# Patient Record
Sex: FEMALE | Race: ASIAN | NOT HISPANIC OR LATINO
[De-identification: names, ages, dates, MRNs, and addresses within clinical notes are randomized per-mention and may not be internally consistent; named-entity substitution may affect disease eponyms.]

---

## 2020-09-30 ENCOUNTER — APPOINTMENT (OUTPATIENT)
Dept: UROLOGY | Facility: CLINIC | Age: 65
End: 2020-09-30
Payer: MEDICARE

## 2020-09-30 VITALS
RESPIRATION RATE: 18 BRPM | TEMPERATURE: 98.1 F | HEART RATE: 66 BPM | OXYGEN SATURATION: 97 % | WEIGHT: 160 LBS | SYSTOLIC BLOOD PRESSURE: 150 MMHG | HEIGHT: 63 IN | DIASTOLIC BLOOD PRESSURE: 90 MMHG | BODY MASS INDEX: 28.35 KG/M2

## 2020-09-30 DIAGNOSIS — Z84.1 FAMILY HISTORY OF DISORDERS OF KIDNEY AND URETER: ICD-10-CM

## 2020-09-30 DIAGNOSIS — Z86.39 PERSONAL HISTORY OF OTHER ENDOCRINE, NUTRITIONAL AND METABOLIC DISEASE: ICD-10-CM

## 2020-09-30 PROCEDURE — 99203 OFFICE O/P NEW LOW 30 MIN: CPT

## 2020-09-30 RX ORDER — ATORVASTATIN CALCIUM 20 MG/1
20 TABLET, FILM COATED ORAL
Refills: 0 | Status: ACTIVE | COMMUNITY

## 2020-09-30 NOTE — ASSESSMENT
[FreeTextEntry1] : 66 yo F with abnormal UA, LUTS\par \par - Obtain UA results from PCP\par - UA, culture\par - PVR = 0ml\par - Discussed possible etiologies for LUTS. Discussed ways to manage them including behavioral modifications such as adequate hydration, controlling constipation, restricting fluids in the evening\par

## 2020-09-30 NOTE — HISTORY OF PRESENT ILLNESS
[FreeTextEntry1] : 64 yo Kiswahili speaking F with abnormal UA,\par no dysuria, no gross hematuria\par no UTI for many years\par 1 yr of LUTS - some weak flow, some straining, incomplete bladder emptying\par Void every 1-2 hrs, nocturia 3/night\par Drinks 3 cups of water, 3 cups of coffee daily\par Some mixed incontinence - sometimes pantiliner when leaving the house\par chronic constipation\par LMP = 15 yrs ago, 2 children, , Not sexually active\par

## 2020-09-30 NOTE — PHYSICAL EXAM
[General Appearance - Well Developed] : well developed [Well Groomed] : well groomed [General Appearance - Well Nourished] : well nourished [Normal Appearance] : normal appearance [General Appearance - In No Acute Distress] : no acute distress [Edema] : no peripheral edema [Respiration, Rhythm And Depth] : normal respiratory rhythm and effort [Exaggerated Use Of Accessory Muscles For Inspiration] : no accessory muscle use [Abdomen Soft] : soft [Costovertebral Angle Tenderness] : no ~M costovertebral angle tenderness [Abdomen Tenderness] : non-tender [External Female Genitalia] : normal external genitalia [Urinary Bladder Findings] : the bladder was normal on palpation [Urethral Meatus] : normal urethra [Normal Station and Gait] : the gait and station were normal for the patient's age [] : no rash [No Focal Deficits] : no focal deficits [Oriented To Time, Place, And Person] : oriented to person, place, and time [Not Anxious] : not anxious [Affect] : the affect was normal [Mood] : the mood was normal [No Palpable Adenopathy] : no palpable adenopathy [FreeTextEntry1] : mild rectocele

## 2020-10-04 LAB
APPEARANCE: CLEAR
BACTERIA UR CULT: NORMAL
BACTERIA: NEGATIVE
BILIRUBIN URINE: NEGATIVE
BLOOD URINE: NORMAL
COLOR: NORMAL
GLUCOSE QUALITATIVE U: NEGATIVE
HYALINE CASTS: 1 /LPF
KETONES URINE: NEGATIVE
LEUKOCYTE ESTERASE URINE: NEGATIVE
MICROSCOPIC-UA: NORMAL
NITRITE URINE: NEGATIVE
PH URINE: 6
PROTEIN URINE: NEGATIVE
RED BLOOD CELLS URINE: 1 /HPF
SPECIFIC GRAVITY URINE: 1.02
SQUAMOUS EPITHELIAL CELLS: 1 /HPF
UROBILINOGEN URINE: NORMAL
WHITE BLOOD CELLS URINE: 1 /HPF

## 2020-12-16 ENCOUNTER — APPOINTMENT (OUTPATIENT)
Age: 65
End: 2020-12-16

## 2022-10-26 ENCOUNTER — APPOINTMENT (OUTPATIENT)
Age: 67
End: 2022-10-26

## 2022-10-26 VITALS
SYSTOLIC BLOOD PRESSURE: 154 MMHG | RESPIRATION RATE: 17 BRPM | WEIGHT: 160 LBS | HEIGHT: 63 IN | HEART RATE: 55 BPM | OXYGEN SATURATION: 97 % | TEMPERATURE: 96.3 F | BODY MASS INDEX: 28.35 KG/M2 | DIASTOLIC BLOOD PRESSURE: 91 MMHG

## 2022-10-26 PROCEDURE — 99214 OFFICE O/P EST MOD 30 MIN: CPT

## 2022-10-26 RX ORDER — ESTRADIOL 0.1 MG/G
0.1 CREAM VAGINAL
Qty: 1 | Refills: 3 | Status: ACTIVE | COMMUNITY
Start: 2022-10-26 | End: 1900-01-01

## 2022-10-26 NOTE — HISTORY OF PRESENT ILLNESS
[FreeTextEntry1] : 64 yo Sami speaking F with abnormal UA,\par no dysuria, no gross hematuria\par no UTI for many years\par 1 yr of LUTS - some weak flow, some straining, incomplete bladder emptying\par Void every 1-2 hrs, nocturia 3/night\par Drinks 3 cups of water, 3 cups of coffee daily\par Some mixed incontinence - sometimes pantiliner when leaving the house\par chronic constipation\par LMP = 15 yrs ago, 2 children, , Not sexually active\par \par 10/26/22 Interval history: Haven't been seen for 2 years\par Having progressively worsening LUTS\par most bothersome is urgency and some urge incontinence\par No pads or diapers\par Still drinking about 3 cups of coffee daily but increased water intake to 3 bottles daily\par Constipation resolved\par PCP prescribed tolterodine about 2 months ago but no efficacy\par

## 2022-10-26 NOTE — PHYSICAL EXAM
[General Appearance - Well Developed] : well developed [General Appearance - Well Nourished] : well nourished [Normal Appearance] : normal appearance [Well Groomed] : well groomed [General Appearance - In No Acute Distress] : no acute distress [Abdomen Soft] : soft [Abdomen Tenderness] : non-tender [Costovertebral Angle Tenderness] : no ~M costovertebral angle tenderness [Urethral Meatus] : normal urethra [Urinary Bladder Findings] : the bladder was normal on palpation [External Female Genitalia] : normal external genitalia [Edema] : no peripheral edema [] : no respiratory distress [Respiration, Rhythm And Depth] : normal respiratory rhythm and effort [Exaggerated Use Of Accessory Muscles For Inspiration] : no accessory muscle use [Oriented To Time, Place, And Person] : oriented to person, place, and time [Affect] : the affect was normal [Mood] : the mood was normal [Not Anxious] : not anxious [Normal Station and Gait] : the gait and station were normal for the patient's age [No Focal Deficits] : no focal deficits [No Palpable Adenopathy] : no palpable adenopathy [FreeTextEntry1] : mild rectocele, neg CST, vaginal atrophy

## 2022-11-21 LAB
APPEARANCE: CLEAR
BACTERIA UR CULT: NORMAL
BACTERIA: NEGATIVE
BILIRUBIN URINE: NEGATIVE
BLOOD URINE: NEGATIVE
COLOR: NORMAL
GLUCOSE QUALITATIVE U: NEGATIVE
HYALINE CASTS: 2 /LPF
KETONES URINE: NEGATIVE
LEUKOCYTE ESTERASE URINE: NEGATIVE
MICROSCOPIC-UA: NORMAL
NITRITE URINE: NEGATIVE
PH URINE: 7
PROTEIN URINE: NEGATIVE
RED BLOOD CELLS URINE: 2 /HPF
SPECIFIC GRAVITY URINE: 1.02
SQUAMOUS EPITHELIAL CELLS: 1 /HPF
UROBILINOGEN URINE: NORMAL
WHITE BLOOD CELLS URINE: 0 /HPF

## 2022-11-30 ENCOUNTER — APPOINTMENT (OUTPATIENT)
Age: 67
End: 2022-11-30

## 2022-11-30 VITALS
OXYGEN SATURATION: 80 % | TEMPERATURE: 94.2 F | SYSTOLIC BLOOD PRESSURE: 154 MMHG | HEART RATE: 85 BPM | DIASTOLIC BLOOD PRESSURE: 86 MMHG | RESPIRATION RATE: 18 BRPM

## 2022-11-30 DIAGNOSIS — R82.90 UNSPECIFIED ABNORMAL FINDINGS IN URINE: ICD-10-CM

## 2022-11-30 DIAGNOSIS — Z00.00 ENCOUNTER FOR GENERAL ADULT MEDICAL EXAMINATION W/OUT ABNORMAL FINDINGS: ICD-10-CM

## 2022-11-30 PROCEDURE — 99213 OFFICE O/P EST LOW 20 MIN: CPT

## 2022-11-30 RX ORDER — OXYBUTYNIN CHLORIDE 5 MG/1
5 TABLET, EXTENDED RELEASE ORAL
Qty: 90 | Refills: 3 | Status: ACTIVE | COMMUNITY
Start: 2022-10-26 | End: 1900-01-01

## 2022-11-30 NOTE — ASSESSMENT
[FreeTextEntry1] : 68 yo F with LUTS, vaginal atrophy\par \par - PVR = 0ml\par - OK to continue oxybutynin. Rx renewed\par - Reaffirmed behavioral modifications\par - Continue estrace\par - FU in 6 months

## 2022-11-30 NOTE — HISTORY OF PRESENT ILLNESS
[FreeTextEntry1] : 64 yo Sinhala speaking F with abnormal UA,\par no dysuria, no gross hematuria\par no UTI for many years\par 1 yr of LUTS - some weak flow, some straining, incomplete bladder emptying\par Void every 1-2 hrs, nocturia 3/night\par Drinks 3 cups of water, 3 cups of coffee daily\par Some mixed incontinence - sometimes pantiliner when leaving the house\par chronic constipation\par LMP = 15 yrs ago, 2 children, , Not sexually active\par \par 10/26/22 Interval history: Haven't been seen for 2 years\par Having progressively worsening LUTS\par most bothersome is urgency and some urge incontinence\par No pads or diapers\par Still drinking about 3 cups of coffee daily but increased water intake to 3 bottles daily\par Constipation resolved\par PCP prescribed tolterodine about 2 months ago but no efficacy\par \par 22 Interval history: Switched to oxybutynin and started on estrace at last visit\par Better urgency and nocturia better 1-2/night\par Also drinking less coffee and noticed improvement as well

## 2022-11-30 NOTE — PHYSICAL EXAM
[General Appearance - Well Developed] : well developed [General Appearance - Well Nourished] : well nourished [Normal Appearance] : normal appearance [Well Groomed] : well groomed [General Appearance - In No Acute Distress] : no acute distress [Abdomen Soft] : soft [Abdomen Tenderness] : non-tender [Costovertebral Angle Tenderness] : no ~M costovertebral angle tenderness [Urethral Meatus] : normal urethra [Urinary Bladder Findings] : the bladder was normal on palpation [External Female Genitalia] : normal external genitalia [FreeTextEntry1] : mild rectocele, neg CST, vaginal atrophy - deferred today [Edema] : no peripheral edema [] : no respiratory distress [Respiration, Rhythm And Depth] : normal respiratory rhythm and effort [Exaggerated Use Of Accessory Muscles For Inspiration] : no accessory muscle use [Oriented To Time, Place, And Person] : oriented to person, place, and time [Affect] : the affect was normal [Mood] : the mood was normal [Not Anxious] : not anxious [Normal Station and Gait] : the gait and station were normal for the patient's age [No Focal Deficits] : no focal deficits [No Palpable Adenopathy] : no palpable adenopathy

## 2023-04-28 ENCOUNTER — APPOINTMENT (OUTPATIENT)
Age: 68
End: 2023-04-28
Payer: MEDICARE

## 2023-04-28 VITALS
SYSTOLIC BLOOD PRESSURE: 168 MMHG | TEMPERATURE: 97.6 F | OXYGEN SATURATION: 96 % | DIASTOLIC BLOOD PRESSURE: 89 MMHG | HEART RATE: 62 BPM

## 2023-04-28 DIAGNOSIS — R39.9 UNSPECIFIED SYMPTOMS AND SIGNS INVOLVING THE GENITOURINARY SYSTEM: ICD-10-CM

## 2023-04-28 PROCEDURE — 99214 OFFICE O/P EST MOD 30 MIN: CPT

## 2023-05-14 PROBLEM — R39.9 LOWER URINARY TRACT SYMPTOMS (LUTS): Status: ACTIVE | Noted: 2020-09-30

## 2023-05-14 NOTE — ASSESSMENT
[FreeTextEntry1] : 69 yo F with persistent LUTS, vaginal atrophy\par \par - Reaffirmed behavioral modifications for LUTS control\par - Reviewed options for her symptoms. Pt interested in other secondline or even thirdline therapies. Will refer to Dr. Lowe for further workup and evaluation\par - Continue estrace for vaginal atrophy

## 2023-05-14 NOTE — HISTORY OF PRESENT ILLNESS
[FreeTextEntry1] : 64 yo Malay speaking F with abnormal UA,\par no dysuria, no gross hematuria\par no UTI for many years\par 1 yr of LUTS - some weak flow, some straining, incomplete bladder emptying\par Void every 1-2 hrs, nocturia 3/night\par Drinks 3 cups of water, 3 cups of coffee daily\par Some mixed incontinence - sometimes pantiliner when leaving the house\par chronic constipation\par LMP = 15 yrs ago, 2 children, , Not sexually active\par \par 10/26/22 Interval history: Haven't been seen for 2 years\par Having progressively worsening LUTS\par most bothersome is urgency and some urge incontinence\par No pads or diapers\par Still drinking about 3 cups of coffee daily but increased water intake to 3 bottles daily\par Constipation resolved\par PCP prescribed tolterodine about 2 months ago but no efficacy\par \par 22 Interval history: Switched to oxybutynin and started on estrace at last visit\par Better urgency and nocturia better 1-2/night\par Also drinking less coffee and noticed improvement as well\par \par 23 Interval history: Still having bothersome nocturia 3-4/night\par normal bowel movements\par Still taking oxybutynin but urgency seems to have returned to her baseline from before\par Hasn't been using estrace

## 2023-05-14 NOTE — PHYSICAL EXAM
[General Appearance - Well Developed] : well developed [General Appearance - Well Nourished] : well nourished [Normal Appearance] : normal appearance [Well Groomed] : well groomed [General Appearance - In No Acute Distress] : no acute distress [Abdomen Soft] : soft [Abdomen Tenderness] : non-tender [Costovertebral Angle Tenderness] : no ~M costovertebral angle tenderness [Urethral Meatus] : normal urethra [Urinary Bladder Findings] : the bladder was normal on palpation [External Female Genitalia] : normal external genitalia [FreeTextEntry1] : mild rectocele, neg CST, vaginal atrophy - deferred today [Edema] : no peripheral edema [] : no respiratory distress [Exaggerated Use Of Accessory Muscles For Inspiration] : no accessory muscle use [Respiration, Rhythm And Depth] : normal respiratory rhythm and effort [Oriented To Time, Place, And Person] : oriented to person, place, and time [Mood] : the mood was normal [Affect] : the affect was normal [Not Anxious] : not anxious [Normal Station and Gait] : the gait and station were normal for the patient's age [No Focal Deficits] : no focal deficits [No Palpable Adenopathy] : no palpable adenopathy

## 2023-06-02 ENCOUNTER — APPOINTMENT (OUTPATIENT)
Dept: UROLOGY | Facility: CLINIC | Age: 68
End: 2023-06-02
Payer: MEDICARE

## 2023-06-02 ENCOUNTER — NON-APPOINTMENT (OUTPATIENT)
Age: 68
End: 2023-06-02

## 2023-06-02 VITALS
HEART RATE: 71 BPM | HEIGHT: 63 IN | DIASTOLIC BLOOD PRESSURE: 82 MMHG | BODY MASS INDEX: 28.35 KG/M2 | SYSTOLIC BLOOD PRESSURE: 144 MMHG | WEIGHT: 160 LBS | RESPIRATION RATE: 16 BRPM | OXYGEN SATURATION: 96 % | TEMPERATURE: 97.2 F

## 2023-06-02 DIAGNOSIS — N95.2 POSTMENOPAUSAL ATROPHIC VAGINITIS: ICD-10-CM

## 2023-06-02 LAB
BILIRUB UR QL STRIP: NEGATIVE
CLARITY UR: CLEAR
COLLECTION METHOD: NORMAL
GLUCOSE UR-MCNC: NEGATIVE
HCG UR QL: 0.2 EU/DL
HGB UR QL STRIP.AUTO: NORMAL
KETONES UR-MCNC: NEGATIVE
LEUKOCYTE ESTERASE UR QL STRIP: NORMAL
NITRITE UR QL STRIP: NEGATIVE
PH UR STRIP: 7
PROT UR STRIP-MCNC: NEGATIVE
SP GR UR STRIP: 1.03

## 2023-06-02 PROCEDURE — 81003 URINALYSIS AUTO W/O SCOPE: CPT | Mod: QW

## 2023-06-02 PROCEDURE — 99214 OFFICE O/P EST MOD 30 MIN: CPT

## 2023-06-02 RX ORDER — MIRABEGRON 50 MG/1
50 TABLET, FILM COATED, EXTENDED RELEASE ORAL
Qty: 30 | Refills: 1 | Status: ACTIVE | COMMUNITY
Start: 2023-06-02 | End: 1900-01-01

## 2023-06-02 NOTE — PHYSICAL EXAM
[General Appearance - Well Developed] : well developed [General Appearance - Well Nourished] : well nourished [Normal Appearance] : normal appearance [Well Groomed] : well groomed [] : no respiratory distress [Affect] : the affect was normal [Mood] : the mood was normal [Normal Station and Gait] : the gait and station were normal for the patient's age

## 2023-06-02 NOTE — HISTORY OF PRESENT ILLNESS
[FreeTextEntry1] : 68F presents for initial evaluation of urinary urgency \par Referred by Dr. Mccann \par  \par PMH significant for: HLD \par PSH significant for: nothing\par Significant meds: atorvastatin  \par \par Seen by Dr. Mccann for LUTS and vaginal atrophy \par Taking oxybutynin—minimal relief, still c/o nocturia 3-4 \par Prescribed estrace cream—not using it (states its too difficult)\par \par Patient reports year-long history of urinary frequency and IRIS (UUI>MADELAINE)\par Reports moderate level of distress \par Associated with  nothing\par Previously treated with oxybutynin\par \par Daytime Frequency: >10 (worse in the morning)\par Nighttime Frequency: 3-4\par Pads per day: 1\par Straining to void: no\par Subjective Incomplete Emptying: yes\par \par Daily Fluid Total: 3 bottles of water \par Daily Caffeine Total: 8oz\par  \par Fecal Incontinence: no\par Neurologic Hx, Vision or Balance changes: no\par

## 2023-06-02 NOTE — ASSESSMENT
[FreeTextEntry1] : Ms. DE SANTIAGO has seen me today for an evaluation of urge-predominant mixed incontinence. \par Her symptoms are refractory to oxybutynin. He has estrace cream for vaginal atrophy but has not been using consistently.\par \par Regarding overactive bladder (OAB) (urinary urgency, frequency, and urge incontinence).  We have extensively reviewed the OAB care pathway and discussed: \par \par 1st line therapy:  \par       -Behavioral therapy -limiting fluid intake, caffeine, and artificial sweeteners; maintaining a normal weight \par       -Medical comorbidities - managing sleep apnea, diabetes, lower extremity edema, and CHF if present  \par       -Conservative management options - observation, pelvic floor physical therapy \par \par 2nd line therapy:  \par       -Medications (anticholinergics, Beta-agonists)\par  \par 3rd line therapy:  \par       -Bladder botulinum toxin injection \par       -Sacral neuromodulation \par   \par Recommendations\par -start Myrbetriq 50 qd\par -continue estrace\par -RTC 1 mo - if refractory will perform pelvic & PVR  before proceeding with 3rd line therapy \par \par

## 2023-07-07 ENCOUNTER — APPOINTMENT (OUTPATIENT)
Dept: UROLOGY | Facility: CLINIC | Age: 68
End: 2023-07-07
Payer: MEDICARE

## 2023-07-07 VITALS
BODY MASS INDEX: 28.35 KG/M2 | DIASTOLIC BLOOD PRESSURE: 104 MMHG | RESPIRATION RATE: 16 BRPM | HEIGHT: 63 IN | WEIGHT: 160 LBS | HEART RATE: 72 BPM | OXYGEN SATURATION: 97 % | TEMPERATURE: 97.2 F | SYSTOLIC BLOOD PRESSURE: 171 MMHG

## 2023-07-07 PROCEDURE — 99214 OFFICE O/P EST MOD 30 MIN: CPT

## 2023-07-07 NOTE — HISTORY OF PRESENT ILLNESS
[FreeTextEntry1] : 68F presents for follow up evaluation \par \par h/o IRIS (UUI>MADELAINE)  \par Failed oxybutynin \par Last plan (6/2): myrbetriq trial, continue estrace cream \par  \par DTF: >10 -> 9-10\par NTF: 3-4 ->2-3\par PPD: 1, unchanged\par \par

## 2023-07-07 NOTE — PHYSICAL EXAM
[General Appearance - Well Developed] : well developed [General Appearance - Well Nourished] : well nourished [] : no respiratory distress [Normal Station and Gait] : the gait and station were normal for the patient's age

## 2023-07-07 NOTE — ASSESSMENT
[FreeTextEntry1] : Ms. Duffy presents for follow up of OAB\par Refractory to oxybutynin, solifenacin\par Now reporting only 20% improvement with Myrbetriq\par Denies adverse effects or baseline HTN, but SBP elevated to 171 today\par Recommended 3rd line treatment, reviewed Botox \par Patient would like to continue medicine for 1 more month before deciding \par \par Recommendations\par -continue Myrbetriq 50\par -RTC 1 mo for PVR check, review 3rd line options \par

## 2023-08-03 ENCOUNTER — APPOINTMENT (OUTPATIENT)
Dept: UROLOGY | Facility: CLINIC | Age: 68
End: 2023-08-03
Payer: MEDICARE

## 2023-08-03 VITALS
SYSTOLIC BLOOD PRESSURE: 161 MMHG | WEIGHT: 160 LBS | HEIGHT: 63 IN | TEMPERATURE: 97.3 F | OXYGEN SATURATION: 94 % | HEART RATE: 64 BPM | BODY MASS INDEX: 28.35 KG/M2 | RESPIRATION RATE: 16 BRPM | DIASTOLIC BLOOD PRESSURE: 89 MMHG

## 2023-08-03 DIAGNOSIS — N32.81 OVERACTIVE BLADDER: ICD-10-CM

## 2023-08-03 PROCEDURE — 99214 OFFICE O/P EST MOD 30 MIN: CPT

## 2023-08-03 NOTE — HISTORY OF PRESENT ILLNESS
[FreeTextEntry1] : 68F presents for follow up evaluation   h/o OAB  Taking myrbetriq 50mg with 30% improvement  Elevated SBP of 171 down to 161 today   DTF: >10--> 9-10  NTF: 3-4--> 2-3   Reports some additional improvement with medication no adverse effects no ready to proceed with 3rd line options

## 2023-08-03 NOTE — ASSESSMENT
[FreeTextEntry1] : Ms. Duffy presents for follow up of OAB Refractory to oxybutynin, solifenacin Now reporting only 20% improvement with Myrbetriq Denies adverse effects or baseline HTN, but SBP elevated to 161 today Recommended 3rd line treatment, reviewed Botox  Patient would like to continue medicine again for 1 more month before deciding   Recommendations -continue Myrbetriq 50 -RTC 1 mo for PVR check, review 3rd line options  
No

## 2023-09-07 ENCOUNTER — APPOINTMENT (OUTPATIENT)
Dept: UROLOGY | Facility: CLINIC | Age: 68
End: 2023-09-07